# Patient Record
Sex: FEMALE | URBAN - METROPOLITAN AREA
[De-identification: names, ages, dates, MRNs, and addresses within clinical notes are randomized per-mention and may not be internally consistent; named-entity substitution may affect disease eponyms.]

---

## 2024-05-08 ENCOUNTER — PATIENT OUTREACH (OUTPATIENT)
Dept: ADMINISTRATIVE | Facility: HOSPITAL | Age: 64
End: 2024-05-08

## 2024-05-08 NOTE — PROGRESS NOTES
Pt haven't seen [facility] since 2022. She is not a current pt of [facility] which list her as no pcp. No phone is listed in chart

## 2024-11-12 ENCOUNTER — PATIENT OUTREACH (OUTPATIENT)
Facility: HOSPITAL | Age: 64
End: 2024-11-12

## 2024-11-12 NOTE — PROGRESS NOTES
11/12/2024   --Chart accessed for: Care Gaps / Gap report  Pt is not a [facility] pt. She last seen this pt at her previous clinic back in 2022. Someone else is responsible for care  Health Maintenance Due   Topic Date Due    Hepatitis C Screening  Never done    Cervical Cancer Screening  Never done    HIV Screening  Never done    TETANUS VACCINE  Never done    Shingles Vaccine (1 of 2) Never done    Influenza Vaccine (1) Never done    COVID-19 Vaccine (1 - 2024-25 season) Never done    Mammogram  09/19/2024